# Patient Record
Sex: FEMALE | Race: WHITE | NOT HISPANIC OR LATINO | URBAN - METROPOLITAN AREA
[De-identification: names, ages, dates, MRNs, and addresses within clinical notes are randomized per-mention and may not be internally consistent; named-entity substitution may affect disease eponyms.]

---

## 2024-06-14 DIAGNOSIS — L40.50 PSORIATIC ARTHRITIS: Primary | ICD-10-CM

## 2024-06-14 DIAGNOSIS — M54.40 CHRONIC BILATERAL LOW BACK PAIN WITH SCIATICA, SCIATICA LATERALITY UNSPECIFIED: ICD-10-CM

## 2024-06-14 DIAGNOSIS — G89.29 CHRONIC BILATERAL LOW BACK PAIN WITH SCIATICA, SCIATICA LATERALITY UNSPECIFIED: ICD-10-CM

## 2024-06-14 RX ORDER — GABAPENTIN 100 MG/1
100 CAPSULE ORAL
COMMUNITY
Start: 2024-04-06 | End: 2024-06-14 | Stop reason: SDUPTHER

## 2024-06-14 RX ORDER — TRAMADOL HYDROCHLORIDE 50 MG/1
50 TABLET ORAL 3 TIMES DAILY PRN
COMMUNITY
End: 2024-06-14 | Stop reason: SDUPTHER

## 2024-06-15 RX ORDER — TRAMADOL HYDROCHLORIDE 50 MG/1
TABLET ORAL
Qty: 540 TABLET | Refills: 0 | Status: SHIPPED | OUTPATIENT
Start: 2024-06-15

## 2024-06-15 RX ORDER — GABAPENTIN 100 MG/1
CAPSULE ORAL
Qty: 450 CAPSULE | Refills: 0 | Status: SHIPPED | OUTPATIENT
Start: 2024-06-15

## 2024-08-15 PROBLEM — M25.50 JOINT PAIN: Status: ACTIVE | Noted: 2024-08-15

## 2024-08-19 ENCOUNTER — SPECIALTY PHARMACY (OUTPATIENT)
Age: 64
End: 2024-08-19
Payer: COMMERCIAL

## 2024-08-23 ENCOUNTER — LAB (OUTPATIENT)
Facility: HOSPITAL | Age: 64
End: 2024-08-23
Payer: COMMERCIAL

## 2024-08-23 ENCOUNTER — OFFICE VISIT (OUTPATIENT)
Age: 64
End: 2024-08-23
Payer: COMMERCIAL

## 2024-08-23 VITALS
HEART RATE: 70 BPM | BODY MASS INDEX: 24.98 KG/M2 | SYSTOLIC BLOOD PRESSURE: 108 MMHG | TEMPERATURE: 97.2 F | DIASTOLIC BLOOD PRESSURE: 70 MMHG | WEIGHT: 155.4 LBS | HEIGHT: 66 IN

## 2024-08-23 DIAGNOSIS — G89.29 CHRONIC LOW BACK PAIN WITHOUT SCIATICA, UNSPECIFIED BACK PAIN LATERALITY: ICD-10-CM

## 2024-08-23 DIAGNOSIS — Z15.89 HLA B27 (HLA B27 POSITIVE): ICD-10-CM

## 2024-08-23 DIAGNOSIS — M54.50 CHRONIC LOW BACK PAIN WITHOUT SCIATICA, UNSPECIFIED BACK PAIN LATERALITY: ICD-10-CM

## 2024-08-23 DIAGNOSIS — Z79.899 HIGH RISK MEDICATION USE: ICD-10-CM

## 2024-08-23 DIAGNOSIS — R53.83 OTHER FATIGUE: ICD-10-CM

## 2024-08-23 DIAGNOSIS — L40.50 PSORIATIC ARTHRITIS: ICD-10-CM

## 2024-08-23 DIAGNOSIS — M54.40 CHRONIC BILATERAL LOW BACK PAIN WITH SCIATICA, SCIATICA LATERALITY UNSPECIFIED: ICD-10-CM

## 2024-08-23 DIAGNOSIS — G89.29 CHRONIC BILATERAL LOW BACK PAIN WITH SCIATICA, SCIATICA LATERALITY UNSPECIFIED: ICD-10-CM

## 2024-08-23 DIAGNOSIS — L40.50 PSA (PSORIATIC ARTHRITIS): ICD-10-CM

## 2024-08-23 DIAGNOSIS — L40.50 PSA (PSORIATIC ARTHRITIS): Primary | ICD-10-CM

## 2024-08-23 DIAGNOSIS — L40.9 PSORIASIS: ICD-10-CM

## 2024-08-23 DIAGNOSIS — Z02.89 PAIN MEDICATION AGREEMENT: ICD-10-CM

## 2024-08-23 DIAGNOSIS — Z79.899 ENCOUNTER FOR MEDICATION MANAGEMENT: ICD-10-CM

## 2024-08-23 DIAGNOSIS — M77.8 ELBOW TENDINITIS: ICD-10-CM

## 2024-08-23 LAB
ALBUMIN SERPL-MCNC: 4.6 G/DL (ref 3.5–5.2)
ALBUMIN/GLOB SERPL: 1.7 G/DL
ALP SERPL-CCNC: 131 U/L (ref 39–117)
ALT SERPL W P-5'-P-CCNC: 12 U/L (ref 1–33)
AMPHET+METHAMPHET UR QL: NEGATIVE
AMPHETAMINES UR QL: NEGATIVE
ANION GAP SERPL CALCULATED.3IONS-SCNC: 9.4 MMOL/L (ref 5–15)
AST SERPL-CCNC: 20 U/L (ref 1–32)
BARBITURATES UR QL SCN: NEGATIVE
BASOPHILS # BLD AUTO: 0.06 10*3/MM3 (ref 0–0.2)
BASOPHILS NFR BLD AUTO: 0.7 % (ref 0–1.5)
BENZODIAZ UR QL SCN: NEGATIVE
BILIRUB SERPL-MCNC: 0.3 MG/DL (ref 0–1.2)
BUN SERPL-MCNC: 12 MG/DL (ref 8–23)
BUN/CREAT SERPL: 14 (ref 7–25)
BUPRENORPHINE SERPL-MCNC: NEGATIVE NG/ML
CALCIUM SPEC-SCNC: 10.1 MG/DL (ref 8.6–10.5)
CANNABINOIDS SERPL QL: NEGATIVE
CHLORIDE SERPL-SCNC: 104 MMOL/L (ref 98–107)
CO2 SERPL-SCNC: 27.6 MMOL/L (ref 22–29)
COCAINE UR QL: NEGATIVE
CREAT SERPL-MCNC: 0.86 MG/DL (ref 0.57–1)
DEPRECATED RDW RBC AUTO: 51.3 FL (ref 37–54)
EGFRCR SERPLBLD CKD-EPI 2021: 77 ML/MIN/1.73
EOSINOPHIL # BLD AUTO: 0.4 10*3/MM3 (ref 0–0.4)
EOSINOPHIL NFR BLD AUTO: 4.6 % (ref 0.3–6.2)
ERYTHROCYTE [DISTWIDTH] IN BLOOD BY AUTOMATED COUNT: 15.2 % (ref 12.3–15.4)
FENTANYL UR-MCNC: NEGATIVE NG/ML
GLOBULIN UR ELPH-MCNC: 2.7 GM/DL
GLUCOSE SERPL-MCNC: 97 MG/DL (ref 65–99)
HCT VFR BLD AUTO: 38.9 % (ref 34–46.6)
HGB BLD-MCNC: 13.2 G/DL (ref 12–15.9)
IMM GRANULOCYTES # BLD AUTO: 0.02 10*3/MM3 (ref 0–0.05)
IMM GRANULOCYTES NFR BLD AUTO: 0.2 % (ref 0–0.5)
LYMPHOCYTES # BLD AUTO: 2.67 10*3/MM3 (ref 0.7–3.1)
LYMPHOCYTES NFR BLD AUTO: 31 % (ref 19.6–45.3)
MCH RBC QN AUTO: 31.3 PG (ref 26.6–33)
MCHC RBC AUTO-ENTMCNC: 33.9 G/DL (ref 31.5–35.7)
MCV RBC AUTO: 92.2 FL (ref 79–97)
METHADONE UR QL SCN: NEGATIVE
MONOCYTES # BLD AUTO: 0.53 10*3/MM3 (ref 0.1–0.9)
MONOCYTES NFR BLD AUTO: 6.1 % (ref 5–12)
NEUTROPHILS NFR BLD AUTO: 4.94 10*3/MM3 (ref 1.7–7)
NEUTROPHILS NFR BLD AUTO: 57.4 % (ref 42.7–76)
NRBC BLD AUTO-RTO: 0 /100 WBC (ref 0–0.2)
OPIATES UR QL: NEGATIVE
OXYCODONE UR QL SCN: NEGATIVE
PCP UR QL SCN: NEGATIVE
PLATELET # BLD AUTO: 250 10*3/MM3 (ref 140–450)
PMV BLD AUTO: 11 FL (ref 6–12)
POTASSIUM SERPL-SCNC: 4 MMOL/L (ref 3.5–5.2)
PROT SERPL-MCNC: 7.3 G/DL (ref 6–8.5)
RBC # BLD AUTO: 4.22 10*6/MM3 (ref 3.77–5.28)
SODIUM SERPL-SCNC: 141 MMOL/L (ref 136–145)
TRICYCLICS UR QL SCN: NEGATIVE
WBC NRBC COR # BLD AUTO: 8.62 10*3/MM3 (ref 3.4–10.8)

## 2024-08-23 PROCEDURE — 86480 TB TEST CELL IMMUN MEASURE: CPT

## 2024-08-23 PROCEDURE — 85025 COMPLETE CBC W/AUTO DIFF WBC: CPT

## 2024-08-23 PROCEDURE — 80307 DRUG TEST PRSMV CHEM ANLYZR: CPT

## 2024-08-23 PROCEDURE — 80053 COMPREHEN METABOLIC PANEL: CPT

## 2024-08-23 PROCEDURE — 36415 COLL VENOUS BLD VENIPUNCTURE: CPT

## 2024-08-23 RX ORDER — TRAMADOL HYDROCHLORIDE 50 MG/1
TABLET ORAL
Qty: 540 TABLET | Refills: 0 | Status: SHIPPED | OUTPATIENT
Start: 2024-08-23 | End: 2025-02-14 | Stop reason: SDUPTHER

## 2024-08-23 RX ORDER — FLUTICASONE PROPIONATE 50 MCG
SPRAY, SUSPENSION (ML) NASAL
COMMUNITY

## 2024-08-23 RX ORDER — CLOBETASOL PROPIONATE 0.5 MG/G
CREAM TOPICAL
COMMUNITY

## 2024-08-23 RX ORDER — ZOLPIDEM TARTRATE 12.5 MG/1
1 TABLET, FILM COATED, EXTENDED RELEASE ORAL
COMMUNITY

## 2024-08-23 RX ORDER — MELOXICAM 15 MG
TABLET ORAL AS NEEDED
COMMUNITY

## 2024-08-23 RX ORDER — IBUPROFEN 800 MG/1
1 TABLET, FILM COATED ORAL EVERY 6 HOURS PRN
COMMUNITY

## 2024-08-23 RX ORDER — HYDROCODONE BITARTRATE AND ACETAMINOPHEN 5; 325 MG/1; MG/1
TABLET ORAL
COMMUNITY

## 2024-08-23 RX ORDER — DULOXETIN HYDROCHLORIDE 30 MG/1
CAPSULE, DELAYED RELEASE ORAL
COMMUNITY
Start: 2024-07-22

## 2024-08-23 RX ORDER — ACYCLOVIR 50 MG/G
OINTMENT TOPICAL
COMMUNITY

## 2024-08-23 RX ORDER — SECUKINUMAB 150 MG/ML
150 INJECTION SUBCUTANEOUS
Qty: 1.96 ML | Refills: 4 | Status: SHIPPED | OUTPATIENT
Start: 2024-08-23

## 2024-08-23 RX ORDER — GABAPENTIN 100 MG/1
CAPSULE ORAL
Qty: 450 CAPSULE | Refills: 0 | Status: SHIPPED | OUTPATIENT
Start: 2024-08-23 | End: 2025-02-14 | Stop reason: SDUPTHER

## 2024-08-23 NOTE — PROGRESS NOTES
[facility] Follow Up Visit     Office Follow Up      Date: 08/23/2024   Patient Name: Tiffany Lucio  MRN: [id number]  YOB: 1960    Referring Physician: Nya Sheppard*     Chief Complaint   Patient presents with    Joint Pain       History of Present Illness: Tiffany Lucio is a 63 y.o. female who is here today for follow up on   History of Present Illness  The patient presents for evaluation of multiple medical concerns.    She is experiencing severe back pain that extends to the top of her foot, limiting her mobility to short distances. She reports a limp [place]. Despite the pain, she does not fear falling. She also mentions leg weakness and an instance where her leg gave out. Her pain level this week has been around 7 out of 10. She believes her pain is exacerbated by stress. She has been under the care of a pain clinic since May 2024 and is awaiting approval for an injection. She has an appointment with Dr. Damaris Hsu in September 2024 for a minimally invasive procedure to shave off a portion of the vertebrae. She is currently on gabapentin 100 mg and tramadol, both of which need refilling. She used to use a TENS unit but no longer has it. She reports no loss of bowel or bladder control.    She reports numbness in her fingers and occasional locking up of her hands, but no severe pain or swelling in her extremity joints. She does not have psoriasis, attributing her clear skin to Cosentyx, which she believes also helps with her psoriatic arthritis. She has one dose of Cosentyx left.    She has a maculopapular skin lesion on the top of her right shoulder and in the axillary region of her left upper extremity. She was informed by her family doctor that it is not cancerous. She is awaiting an appointment with a dermatologist in [place]. She also plans to consult the dermatologist about her  worsening varicose veins.       Result Review :        Results            Subjective     Allergies   Allergen Reactions    Acetaminophen Provider Review Needed    Methotrexate Derivatives Provider Review Needed    Sulfa Antibiotics Nausea And Vomiting    Sulfamethoxazole Nausea And Vomiting    Trimethoprim Nausea And Vomiting         Current Outpatient Medications:     acyclovir (ZOVIRAX) 5 % ointment, APPLY TO AFFECTED AREA SIX TIMES A DAY FOR 7 DAYS, Disp: , Rfl:     clobetasol propionate (TEMOVATE) 0.05 % cream, APPLY TOPICALLY TO THE AFFECTED AREA TWICE DAILY FOR UP TO 2 WEEKS. STOP FOR 1 WEEK. REPEAT AS NEEDED, Disp: , Rfl:     DULoxetine (CYMBALTA) 30 MG capsule, TAKE 1 CAPSULE BY MOUTH EVERY NIGHT AT BEDTIME FOR PAIN, Disp: , Rfl:     fluticasone (FLONASE) 50 MCG/ACT nasal spray, INSTILL 1 SPRAY INTO EACH NOSTRIL DAILY, Disp: , Rfl:     gabapentin (NEURONTIN) 100 MG capsule, One capsule in the morning and afternoon. Three capsules at bedtime, Disp: 450 capsule, Rfl: 0    HYDROcodone-acetaminophen (NORCO) 5-325 MG per tablet, TAKE 1 TABLET BY MOUTH EVERY 12 HOURS AS NEEDED. DO NOT USE IN CONJUNCTION WITH TRAMADOL, Disp: , Rfl:     ibuprofen (ADVIL,MOTRIN) 800 MG tablet, Take 1 tablet by mouth Every 6 (Six) Hours As Needed., Disp: , Rfl:     levothyroxine (SYNTHROID, LEVOTHROID) 50 MCG tablet, Take 1 tablet by mouth Daily., Disp: , Rfl:     lisinopril (PRINIVIL,ZESTRIL) 10 MG tablet, Take 1 tablet by mouth Daily., Disp: , Rfl:     Mobic 15 MG tablet, As Needed., Disp: , Rfl:     Secukinumab (Cosentyx) 150 MG/ML solution prefilled syringe, Inject  under the skin into the appropriate area as directed., Disp: , Rfl:     sertraline (ZOLOFT) 100 MG tablet, Take 1 tablet by mouth Daily., Disp: , Rfl:     traMADol (ULTRAM) 50 MG tablet, Take 1-2 tablets TID prn pain, Disp: 540 tablet, Rfl: 0    zolpidem CR (AMBIEN CR) 12.5 MG CR tablet, Take 1 tablet by mouth every night at bedtime., Disp: , Rfl:     Past Medical  "History:   Diagnosis Date    Back pain     H/O umbilical hernia repair     Hyperlipidemia     Hypermobility of joint     Hypertension     Hypothyroidism     Joint pain     Rash of back         Past Surgical History:   Procedure Laterality Date    APPENDECTOMY      HIP BIPOLAR REPLACEMENT      HIP BIPOLAR REPLACEMENT      TOOTH EXTRACTION         Family History   Problem Relation Age of Onset    Heart failure Mother     Other (Bladder cancer) Father     Heart disease Father     Arthritis Sister     Breast cancer Sister     Brain cancer Brother         Social History     Socioeconomic History    Marital status:    Tobacco Use    Smoking status: Every Day     Types: Cigarettes    Smokeless tobacco: Never   Vaping Use    Vaping status: Never Used   Substance and Sexual Activity    Alcohol use: Defer    Drug use: Defer    Sexual activity: Defer       Review of Systems   Musculoskeletal:  Positive for arthralgias, back pain and neck stiffness.   Neurological:  Positive for weakness and numbness.      I have reviewed and updated the patient's chief complaint, history of present illness, review of systems, past medical history, surgical history, family history, social history, medications and allergy list as appropriate.     Objective    Vital Signs:   Vitals:    08/23/24 1237   BP: 108/70   BP Location: Left arm   Patient Position: Sitting   Cuff Size: Adult   Pulse: 70   Temp: 97.2 °F (36.2 °C)   Weight: 70.5 kg (155 lb 6.4 oz)   Height: 167.6 cm (66\")   PainSc:   7     Tiffany Lucio reports a pain score of 7.  Given her pain assessment as noted, treatment options were discussed and the following options were decided upon as a follow-up plan to address the patient's pain: .      Body mass index is 25.08 kg/m².        Physical Exam   Physical Exam  Patient is alert, female, and in no acute distress.  Head is atraumatic and normocephalic with pupils equal and round. Extraocular muscles are intact. Oropharynx is " negative, but the tongue is slightly dry in the middle.  Lungs are clear.  Heart is regular without rubs, gallops, or murmurs.  Lower lumbar spine is very tender to touch. Right shoulder is tender. Patient has relatively good range of motion. Heberden's nodes, first CMC squaring, and a prominent ulnar styloid on the right hand without synovitis are present. Similar findings are observed on the left hand. Crepitus of the left knee and crepitus of the right knee are noted. Knees, ankles, and MTPs are nontender and nonswollen. Varicose veins are present in the lower extremities.  Skin is tan and negative for psoriasis. A maculopapular skin lesion is present on the top of the right shoulder and the axillary region of the left upper extremity.    Physical Exam  There is currently no information documented on the homunculus. Go to the Rheumatology activity and [facility].     Results Review:   Imaging Results (Last 24 Hours)       ** No results found for the last 24 hours. **            Procedures    Assessment / Plan    Assessment/Plan:   There are no diagnoses linked to this encounter.      Assessment & Plan  1. Psoriatic Arthritis.  Continuation of Cosentyx is advised. She reports that Cosentyx may help with her hand symptoms but not her back pain. She is advised to continue monitoring her symptoms and report any changes.    2. Back Pain.  Gabapentin 100 mg will be continued for pain management. She reports severe pain radiating to the top of her foot and some leg weakness. She is scheduled to follow up with Dr. Duff regarding her progressing symptoms and potential insurance coverage for surgery. She is advised to consider using a TENS unit for additional pain relief.    3. Psoriasis.  Her condition is currently well-managed with Cosentyx. She reports her skin has been clear.    4. Granuloma Annulare.  She has a maculopapular skin lesion on the top of the right shoulder and the axillary region  of the left upper extremity. She is HLA-B27 positive.    5. Pain Medication Management.  A [facility] pain agreement will be signed today due to her prescription of tramadol and gabapentin. She is due for a urine drug screen today.    6. High-Risk Medication.  Cosentyx has been well-tolerated and effective. She is due for her QTB and labs today.          Follow Up:   No follow-ups on file.       Nola Ellis MD  [facility]     Encounter Administratively Closed by Health Information Management

## 2024-08-26 ENCOUNTER — TELEPHONE (OUTPATIENT)
Age: 64
End: 2024-08-26
Payer: COMMERCIAL

## 2024-08-26 NOTE — TELEPHONE ENCOUNTER
File Link    Scan on 8/26/2024 1139 by [employer], [place]: cosentyx 150mg/ml 8/26/24        Key Information    Document ID File Type Document Type Description   [id number] Image MEDICATIONS - SCAN cosentyx 150mg/ml 8/26/24     Import Information    Attached At Date Time User Dept   Encounter Level 8/26/2024 11:39 AM [employer], [place]      Encounter    Scanned Document on 8/26/24 with [employer], [place]

## 2024-08-27 LAB
GAMMA INTERFERON BACKGROUND BLD IA-ACNC: 0.02 IU/ML
M TB IFN-G BLD-IMP: NEGATIVE
M TB IFN-G CD4+ BCKGRND COR BLD-ACNC: 0.02 IU/ML
M TB IFN-G CD4+CD8+ BCKGRND COR BLD-ACNC: 0.02 IU/ML
MITOGEN IGNF BCKGRD COR BLD-ACNC: >10 IU/ML
QUANTIFERON INCUBATION: NORMAL
SERVICE CMNT-IMP: NORMAL

## 2024-09-17 PROBLEM — Z15.89 HLA B27 (HLA B27 POSITIVE): Status: ACTIVE | Noted: 2024-09-17

## 2024-09-17 PROBLEM — L40.9 PSORIASIS: Status: ACTIVE | Noted: 2024-09-17

## 2024-09-17 PROBLEM — M77.8 ELBOW TENDINITIS: Status: ACTIVE | Noted: 2024-09-17

## 2024-09-17 PROBLEM — M54.42 CHRONIC BILATERAL LOW BACK PAIN WITH SCIATICA: Status: ACTIVE | Noted: 2024-09-17

## 2024-09-17 PROBLEM — Z79.899 HIGH RISK MEDICATION USE: Status: ACTIVE | Noted: 2024-09-17

## 2024-09-17 PROBLEM — L40.50 PSORIATIC ARTHRITIS: Status: ACTIVE | Noted: 2024-09-17

## 2024-09-17 PROBLEM — M54.40 CHRONIC BILATERAL LOW BACK PAIN WITH SCIATICA: Status: ACTIVE | Noted: 2024-09-17

## 2024-09-17 PROBLEM — M54.41 CHRONIC BILATERAL LOW BACK PAIN WITH SCIATICA: Status: ACTIVE | Noted: 2024-09-17

## 2024-09-17 PROBLEM — G89.29 CHRONIC BILATERAL LOW BACK PAIN WITH SCIATICA: Status: ACTIVE | Noted: 2024-09-17

## 2024-09-17 PROBLEM — Z02.89 PAIN MEDICATION AGREEMENT: Status: ACTIVE | Noted: 2024-09-17

## 2024-09-17 NOTE — ASSESSMENT & PLAN NOTE
Sacroiliac ,Lower back ,First Cmc joint's,Right ankle  History of Mcp/Pip stiffness with Pip swelling.  Started a trial of Ssz for inflammatory arthritis/Spondyloarthropathy but patient was unable to take this.   Developed psoriasis.  Dramatic response to Steroids.  No h/o liver disease- Rarely drinks.  Methotrexate stopped 2/19 secondary to burning mouth/ oral sores.  Humira started March 2018- Discontinued 3/20 due to loss of effect.   Started Enbrel March 2020- Does not work for patient on her joints.   Started Cosentyx March 2021- very helpful, restarted 8/2022  Psoriatic joints are doing well.    Plan:    Continue Cosentyx.   For OA of the thumbs:  Comfort cool splints  Compression gloves  Biofreeze  Can schedule injection if needed.

## 2024-09-17 NOTE — ASSESSMENT & PLAN NOTE
Chronic; s/p 2 surgeries- Dr. Duff.  Patient has a +HLA B27 with significant tenderness over her SI joints and Peripheral Arthritis.  DDD +--   Over the past several months she C/o pain that radiates from R buttock to foot - like toothache that is severe at HS. Denies numbness.  MRI has been ordered multiple times in order for her f/u with Dr. Duff; However she has had many issues with hospital scheduling.  MRI of the lumbar spine- Progression of severe spinal stenosis at L3-4. Stable L4-5 fixation. Mild DDD L1-L3.  Patient was supposed to f/u with Dr. Duff after MRI was obtained.  S/p Lumbar fusion L3-4 ext 9/2020 with Dr. Duff  She has pain that radiates from her buttock down to the knee.    Plan:    Continue gabapentin.  Dr. Duff  - They wanted to do L2 to iliac fusion. Her insurance denied this until it became an emergency.  THIS IS HER WORST ISSUE TODAY.

## 2024-09-17 NOTE — ASSESSMENT & PLAN NOTE
Tramadol. Gabapentin   Agreement signed 11/18/21    Plan:    On Tramadol and gabapentin    UDS 8/23    Gabapentin to 100 in am , 100 in afternoon and 300mg at bedtime.  Lyrica is another option.

## 2024-09-17 NOTE — ASSESSMENT & PLAN NOTE
Lumbar over her surgery scar.  Present on LE's, Back.  Saw Derm who prescribed topicals.  S/p Humira and Enbrel, MTX, SSZ, Cosentyx.    Plan:  Clear on Cosentyx.

## 2024-09-17 NOTE — ASSESSMENT & PLAN NOTE
Cosentyx - well tolerated and very effective.   QTB negative 8/23  S/p Covid vaccine.     Plan:    Cbc,Cmp Q 4 months - 8/23 She uses ibuprofen every 2 days.       Would hold Cosentyx for any infection or illness, Seek treatment and when well and illness is resolved you may restart medication.

## 2024-12-27 ENCOUNTER — TELEPHONE (OUTPATIENT)
Age: 64
End: 2024-12-27
Payer: COMMERCIAL

## 2024-12-27 NOTE — TELEPHONE ENCOUNTER
PT HAD TO BE CANCELLED DUE TO A PROVIDER EMERGENCY. I HAVE REACHED OUT VIA [employer], [employer] AND PHONE (IF ABLE). IF PT CALLS BACK TO RESCHEDULE, PLEASE SCHEDULE WITH [facility] OR [facility]. IF THE PT WANTS TO SEE DR. NOBLE, SHE IS BOOKED UNTIL APRIL. IF IT IS A NEW PT, CONFRIM IF THEY ARE INTRESTED IN SEEING ANOTHER PROVIDER OR IF THEY WANT TO WAIT FOR DR. NOBLE. PT HAS BEEN ADDED TO THE CANCELLATION LIST AS HIGH PRIORITY.         -HUB READY TO SCHEDULE.

## 2025-02-14 DIAGNOSIS — L40.50 PSORIATIC ARTHRITIS: ICD-10-CM

## 2025-02-14 DIAGNOSIS — G89.29 CHRONIC BILATERAL LOW BACK PAIN WITH SCIATICA, SCIATICA LATERALITY UNSPECIFIED: ICD-10-CM

## 2025-02-14 DIAGNOSIS — M54.40 CHRONIC BILATERAL LOW BACK PAIN WITH SCIATICA, SCIATICA LATERALITY UNSPECIFIED: ICD-10-CM

## 2025-02-14 NOTE — TELEPHONE ENCOUNTER
Caller: Tiffany Lucio    Relationship: Self    Best call back number: [phone]     Requested Prescriptions:   gabapentin (NEURONTIN) 100 MG capsule        traMADol (ULTRAM) 50 MG tablet     Pharmacy where request should be sent:     [facility] at [address] [place] [address] [address] [place], [place]        Additional details provided by patient: PATIENT SAID THEY HAVE BETWEEN A WEEK AND 2 WEEKS AND NEEDS DAYAN    Does the patient have less than a 3 day supply:  [] Yes  [x] No    Would you like a call back once the refill request has been completed: [x] Yes [] No    If the office needs to give you a call back, can they leave a voicemail: [x] Yes [] No    Zacarias Garcia Rep   02/14/25 12:54 EST

## 2025-02-18 RX ORDER — GABAPENTIN 100 MG/1
CAPSULE ORAL
Qty: 450 CAPSULE | Refills: 0 | Status: SHIPPED | OUTPATIENT
Start: 2025-02-18

## 2025-02-18 RX ORDER — TRAMADOL HYDROCHLORIDE 50 MG/1
TABLET ORAL
Qty: 540 TABLET | Refills: 0 | Status: SHIPPED | OUTPATIENT
Start: 2025-02-18

## 2025-03-06 DIAGNOSIS — L40.50 PSORIATIC ARTHRITIS: ICD-10-CM

## 2025-03-06 DIAGNOSIS — G89.29 CHRONIC BILATERAL LOW BACK PAIN WITH SCIATICA, SCIATICA LATERALITY UNSPECIFIED: ICD-10-CM

## 2025-03-06 DIAGNOSIS — M54.40 CHRONIC BILATERAL LOW BACK PAIN WITH SCIATICA, SCIATICA LATERALITY UNSPECIFIED: ICD-10-CM

## 2025-03-06 NOTE — TELEPHONE ENCOUNTER
Caller: Tiffany Lucio     Relationship: Self     Best call back number: [phone]      Requested Prescriptions:   gabapentin (NEURONTIN) 100 MG capsule          traMADol (ULTRAM) 50 MG tablet      Pharmacy where request should be sent:      [facility] at [address] [place] [address]           Additional details provided by patient: PT IS COMPLETELY OUT.      Does the patient have less than a 3 day supply:  [x] Yes  [] No     Would you like a call back once the refill request has been completed: [x] Yes [] No     If the office needs to give you a call back, can they leave a voicemail: [x] Yes [] No

## 2025-03-07 RX ORDER — GABAPENTIN 100 MG/1
CAPSULE ORAL
Qty: 450 CAPSULE | Refills: 0 | Status: SHIPPED | OUTPATIENT
Start: 2025-03-07

## 2025-03-07 RX ORDER — TRAMADOL HYDROCHLORIDE 50 MG/1
TABLET ORAL
Qty: 540 TABLET | Refills: 0 | Status: SHIPPED | OUTPATIENT
Start: 2025-03-07

## 2025-04-23 PROBLEM — M18.0 PRIMARY OSTEOARTHRITIS OF BOTH FIRST CARPOMETACARPAL JOINTS: Status: ACTIVE | Noted: 2025-04-23

## 2025-05-27 NOTE — ASSESSMENT & PLAN NOTE
Sacroiliac ,Lower back ,First Cmc joint's,Right ankle  History of Mcp/Pip stiffness with Pip swelling.  Started a trial of Ssz for inflammatory arthritis/Spondyloarthropathy but patient was unable to take this.   Developed psoriasis.  Dramatic response to Steroids.  No h/o liver disease- Rarely drinks.  Methotrexate stopped 2/19 secondary to burning mouth/ oral sores.  Humira started March 2018- Discontinued 3/20 due to loss of effect.   Started Enbrel March 2020- Does not work for patient on her joints.   Started Cosentyx March 2021- very helpful, restarted 8/2022  Psoriatic joints are doing well.    Plan:  PA Cosentyx.   For OA of the thumbs:  Comfort cool splints  Compression gloves  Biofreeze  Can schedule injection if needed.     Orders:    QuantiFERON-TB Gold Plus (Li-Hep)    Comprehensive Metabolic Panel    C-reactive Protein    Sedimentation Rate    CBC (No Diff)

## 2025-05-27 NOTE — ASSESSMENT & PLAN NOTE
Cosentyx - has been off medication.   PA today as she states this was the most effective for pain and psoriasis.   QTB negative 8/23  S/p Covid vaccine.     Plan:    Labs today, update QTB and hep panel.     Would hold Cosentyx for any infection or illness, Seek treatment and when well and illness is resolved you may restart medication.  Orders:    Urine Drug Screen - Urine, Clean Catch

## 2025-05-27 NOTE — ASSESSMENT & PLAN NOTE
Chronic; s/p 3 surgeries- Dr. Duff.  Patient has a +HLA B27 with significant tenderness over her SI joints and Peripheral Arthritis.  DDD +--   Continues to reports pain that radiates from R buttock to foot.   MRI of the lumbar spine- Progression of severe spinal stenosis at L3-4. Stable L4-5 fixation. Mild DDD L1-L3.  Patient was supposed to have an additional sx with Dr. Duff however insurance will not cover the sx.   S/p Lumbar fusion L3-4 ext 9/2020 with Dr. Duff  She had injections of the SI joint with pain management, this was only effective for a few days.     Plan:    Continue gabapentin  Continue with pain management clinic.   Dr. Duff  - Appointment this month, continue to follow up.    Orders:    gabapentin (NEURONTIN) 400 MG capsule; Take 1 capsule by mouth Every Night.

## 2025-05-27 NOTE — ASSESSMENT & PLAN NOTE
Lumbar over her surgery scar is reported as most common location for breakouts.   Present on LE's, abdomen, groin and buttocks.   Saw Derm who prescribed topicals.  S/p Humira and Enbrel, MTX, SSZ, Cosentyx.    Plan:  PA Cosentyx- was clear on medication previously.          unchanged

## 2025-05-27 NOTE — ASSESSMENT & PLAN NOTE
Gabapentin   Agreement - update at next visit   UDS ordered 6/2/25    Plan:  gabapentin - change dosage to 400mg nightly as this is how patient has been taking per suggestion of pain management, if she would like pain management to take over medications she is to let us know.   She takes Hydrocodone per pain management at this time.

## 2025-05-27 NOTE — PROGRESS NOTES
[facility] Follow Up Visit     Office Follow Up      Date: 06/02/2025   Patient Name: Tiffany Lucio  MRN: [id number]  YOB: 1960    Referring Physician: No ref. provider found     Chief Complaint   Patient presents with    Follow-up    Psoriatic arthritis       History of Present Illness: Tiffany Lucio is a 64 y.o. female who is here today for follow up of PSA and positive HLAB27 with chronic back pain    Patient was last seen by Dr. Nola Ellis 8/23/2024.   Today patient reports feeling worse.  She rates her pain severity 7/10 with 30 minutes of early morning stiffness.  She has been prescribed hydrocodone 10/325 every 6 hours by another provider.  She is no longer taking Cymbalta, Ultram, Mobic, or Cosentyx. Her insurance would not cover the Cosentyx, she reports she has been off this medication for about a year and noted her symptoms were much improved when she was taking it.     She sees Dr. Duff June 26th, this month. She has had 3 back surgeries with him and planned for a 4th, however her insurance would not approve this planned surgery. Back pain is driving pain score. She did get in with pain management and she had injections in her SI joint which only helped for a few days. She is now having pain radiating into her legs, right is worse than the left. Pain management suggested she take 400mg Gabapentin at night time, she has not been taking daytime dose as they make her drowsy. She has been taking the 400mg nightly as suggested, she is asking today about this dosage.     Psoriasis- she has had some breakouts to her foot, groin, buttocks and abdomen.       Subjective     Allergies   Allergen Reactions    Acetaminophen Provider Review Needed    Methotrexate Derivatives Provider Review Needed    Sulfa Antibiotics Nausea And Vomiting    Sulfamethoxazole Nausea And Vomiting    Trimethoprim Nausea And Vomiting       Current Outpatient Medications:      acyclovir (ZOVIRAX) 5 % ointment, APPLY TO AFFECTED AREA SIX TIMES A DAY FOR 7 DAYS, Disp: , Rfl:     clobetasol propionate (TEMOVATE) 0.05 % cream, APPLY TOPICALLY TO THE AFFECTED AREA TWICE DAILY FOR UP TO 2 WEEKS. STOP FOR 1 WEEK. REPEAT AS NEEDED, Disp: , Rfl:     fluticasone (FLONASE) 50 MCG/ACT nasal spray, INSTILL 1 SPRAY INTO EACH NOSTRIL DAILY, Disp: , Rfl:     HYDROcodone-acetaminophen (NORCO)  MG per tablet, Take 1 tablet by mouth Every 6 (Six) Hours As Needed for Severe Pain., Disp: , Rfl:     hydrocortisone 2.5 % cream, Apply 1 Application topically to the appropriate area as directed 2 (Two) Times a Day., Disp: , Rfl:     ibuprofen (ADVIL,MOTRIN) 200 MG tablet, Take 1 tablet by mouth Every 6 (Six) Hours As Needed for Mild Pain., Disp: , Rfl:     levothyroxine (SYNTHROID, LEVOTHROID) 50 MCG tablet, Take 1 tablet by mouth Daily., Disp: , Rfl:     lisinopril (PRINIVIL,ZESTRIL) 10 MG tablet, Take 1 tablet by mouth Daily., Disp: , Rfl:     sertraline (ZOLOFT) 100 MG tablet, Take 1 tablet by mouth Daily., Disp: , Rfl:     zolpidem CR (AMBIEN CR) 12.5 MG CR tablet, Take 1 tablet by mouth every night at bedtime., Disp: , Rfl:     gabapentin (NEURONTIN) 400 MG capsule, Take 1 capsule by mouth Every Night., Disp: 30 capsule, Rfl: 3    Secukinumab (Cosentyx) 150 MG/ML solution prefilled syringe, Inject 1 mL under the skin into the appropriate area as directed Every 30 (Thirty) Days. (Patient not taking: Reported on 6/2/2025), Disp: 1.96 mL, Rfl: 4    Past Medical History:   Diagnosis Date    Back pain     H/O umbilical hernia repair     Hyperlipidemia     Hypermobility of joint     Hypertension     Hypothyroidism     Joint pain     Rash of back         Past Surgical History:   Procedure Laterality Date    APPENDECTOMY      HIP BIPOLAR REPLACEMENT      HIP BIPOLAR REPLACEMENT      TOOTH EXTRACTION         Family History   Problem Relation Age of Onset    Heart failure Mother     Other (Bladder cancer)  "Father     Heart disease Father     Arthritis Sister     Breast cancer Sister     Brain cancer Brother         Social History     Socioeconomic History    Marital status:    Tobacco Use    Smoking status: Every Day     Types: Cigarettes    Smokeless tobacco: Never   Vaping Use    Vaping status: Never Used   Substance and Sexual Activity    Alcohol use: Defer    Drug use: Defer    Sexual activity: Defer       Review of Systems   Positive for activity change, fatigue, joint pain, back pain, muscle pain, neck pain, neck stiffness, lesion    I have reviewed and updated the patient's chief complaint, history of present illness, review of systems, past medical history, surgical history, family history, social history, medications and allergy list as appropriate.     Objective    Vital Signs:   Vitals:    06/02/25 1133   BP: 112/62   Pulse: 58   Temp: 97.2 °F (36.2 °C)   Weight: 65.7 kg (144 lb 12.8 oz)   Height: 167.6 cm (66\")   PainSc: 7    PainLoc: Back  Comment: legs         Body mass index is 23.37 kg/m².      Physical Exam  Constitutional:       Appearance: Normal appearance.   HENT:      Head: Normocephalic.   Eyes:      Pupils: Pupils are equal, round, and reactive to light.   Cardiovascular:      Rate and Rhythm: Regular rhythm.      Pulses: Normal pulses.      Heart sounds: Normal heart sounds.   Pulmonary:      Effort: Pulmonary effort is normal.      Breath sounds: Normal breath sounds.   Musculoskeletal:      Cervical back: Normal range of motion and neck supple.      Comments: No synovitis   No dactylitis   No pitting nails      Skin:     General: Skin is warm.      Comments: Small erythematous plaques and papules to foot, abdomen and groin    Neurological:      General: No focal deficit present.      Mental Status: She is alert and oriented to person, place, and time.   Psychiatric:         Behavior: Behavior normal.         Thought Content: Thought content normal.        Assessment / Plan  "     Assessment & Plan  Psoriatic arthritis  Sacroiliac ,Lower back ,First Cmc joint's,Right ankle  History of Mcp/Pip stiffness with Pip swelling.  Started a trial of Ssz for inflammatory arthritis/Spondyloarthropathy but patient was unable to take this.   Developed psoriasis.  Dramatic response to Steroids.  No h/o liver disease- Rarely drinks.  Methotrexate stopped 2/19 secondary to burning mouth/ oral sores.  Humira started March 2018- Discontinued 3/20 due to loss of effect.   Started Enbrel March 2020- Does not work for patient on her joints.   Started Cosentyx March 2021- very helpful, restarted 8/2022  Psoriatic joints are doing well.    Plan:  PA Cosentyx.   For OA of the thumbs:  Comfort cool splints  Compression gloves  Biofreeze  Can schedule injection if needed.     Orders:    QuantiFERON-TB Gold Plus (Li-Hep)    Comprehensive Metabolic Panel    C-reactive Protein    Sedimentation Rate    CBC (No Diff)     Chronic bilateral low back pain with sciatica, sciatica laterality unspecified  Chronic; s/p 3 surgeries- Dr. Duff.  Patient has a +HLA B27 with significant tenderness over her SI joints and Peripheral Arthritis.  DDD +--   Continues to reports pain that radiates from R buttock to foot.   MRI of the lumbar spine- Progression of severe spinal stenosis at L3-4. Stable L4-5 fixation. Mild DDD L1-L3.  Patient was supposed to have an additional sx with Dr. Duff however insurance will not cover the sx.   S/p Lumbar fusion L3-4 ext 9/2020 with Dr. Duff  She had injections of the SI joint with pain management, this was only effective for a few days.     Plan:    Continue gabapentin  Continue with pain management clinic.   Dr. Duff  - Appointment this month, continue to follow up.    Orders:    gabapentin (NEURONTIN) 400 MG capsule; Take 1 capsule by mouth Every Night.     Psoriasis  Lumbar over her surgery scar is reported as most common location for breakouts.   Present on LE's, abdomen, groin and buttocks.    Saw Derm who prescribed topicals.  S/p Humira and Enbrel, MTX, SSZ, Cosentyx.    Plan:  PA Cosentyx- was clear on medication previously.         HLA B27 (HLA B27 positive)  See above.        Pain medication agreement  Gabapentin   Agreement - update at next visit   UDS ordered 6/2/25    Plan:  gabapentin - change dosage to 400mg nightly as this is how patient has been taking per suggestion of pain management, if she would like pain management to take over medications she is to let us know.   She takes Hydrocodone per pain management at this time.           High risk medication use  Cosentyx - has been off medication.   PA today as she states this was the most effective for pain and psoriasis.   QTB negative 8/23  S/p Covid vaccine.     Plan:    Labs today, update QTB and hep panel.     Would hold Cosentyx for any infection or illness, Seek treatment and when well and illness is resolved you may restart medication.  Orders:    Urine Drug Screen - Urine, Clean Catch     Other fatigue  Labs today   Orders:    Hepatitis Panel, Acute    QuantiFERON-TB Gold Plus (Li-Hep)      Follow Up:   Return in about 4 months (around 10/2/2025) for NP's.     LUISA Gallegos    [facility]

## 2025-06-02 ENCOUNTER — OFFICE VISIT (OUTPATIENT)
Age: 65
End: 2025-06-02
Payer: COMMERCIAL

## 2025-06-02 ENCOUNTER — TELEPHONE (OUTPATIENT)
Age: 65
End: 2025-06-02

## 2025-06-02 VITALS
TEMPERATURE: 97.2 F | HEART RATE: 58 BPM | WEIGHT: 144.8 LBS | SYSTOLIC BLOOD PRESSURE: 112 MMHG | DIASTOLIC BLOOD PRESSURE: 62 MMHG | BODY MASS INDEX: 23.27 KG/M2 | HEIGHT: 66 IN

## 2025-06-02 DIAGNOSIS — Z02.89 PAIN MEDICATION AGREEMENT: ICD-10-CM

## 2025-06-02 DIAGNOSIS — G89.29 CHRONIC BILATERAL LOW BACK PAIN WITH SCIATICA, SCIATICA LATERALITY UNSPECIFIED: ICD-10-CM

## 2025-06-02 DIAGNOSIS — M54.42 CHRONIC BILATERAL LOW BACK PAIN WITH SCIATICA, SCIATICA LATERALITY UNSPECIFIED: ICD-10-CM

## 2025-06-02 DIAGNOSIS — M54.41 CHRONIC BILATERAL LOW BACK PAIN WITH SCIATICA, SCIATICA LATERALITY UNSPECIFIED: ICD-10-CM

## 2025-06-02 DIAGNOSIS — L40.9 PSORIASIS: ICD-10-CM

## 2025-06-02 DIAGNOSIS — Z79.899 HIGH RISK MEDICATION USE: Chronic | ICD-10-CM

## 2025-06-02 DIAGNOSIS — Z79.899 ENCOUNTER FOR MEDICATION MANAGEMENT: Chronic | ICD-10-CM

## 2025-06-02 DIAGNOSIS — L40.50 PSORIATIC ARTHRITIS: Primary | Chronic | ICD-10-CM

## 2025-06-02 DIAGNOSIS — R53.83 OTHER FATIGUE: ICD-10-CM

## 2025-06-02 DIAGNOSIS — Z15.89 HLA B27 (HLA B27 POSITIVE): ICD-10-CM

## 2025-06-02 RX ORDER — HYDROCORTISONE 25 MG/G
1 CREAM TOPICAL 2 TIMES DAILY
COMMUNITY

## 2025-06-02 RX ORDER — SECUKINUMAB 150 MG/ML
1 INJECTION SUBCUTANEOUS
Qty: 1 ML | Refills: 3 | Status: SHIPPED | OUTPATIENT
Start: 2025-06-02

## 2025-06-02 RX ORDER — IBUPROFEN 200 MG
200 TABLET ORAL EVERY 6 HOURS PRN
COMMUNITY

## 2025-06-02 RX ORDER — GABAPENTIN 400 MG/1
400 CAPSULE ORAL NIGHTLY
Qty: 30 CAPSULE | Refills: 3 | Status: SHIPPED | OUTPATIENT
Start: 2025-06-02 | End: 2025-06-06 | Stop reason: SINTOL

## 2025-06-02 RX ORDER — GABAPENTIN 400 MG/1
400 CAPSULE ORAL NIGHTLY
Qty: 30 CAPSULE | Refills: 3 | Status: CANCELLED | OUTPATIENT
Start: 2025-06-02

## 2025-06-02 NOTE — TELEPHONE ENCOUNTER
Patient has not been on her Cosentyx for about a year at this time because her insurance is not covering it from what she told me. Can we look into this for her? This was working well for her.

## 2025-06-02 NOTE — TELEPHONE ENCOUNTER
Specialty Pharmacy Patient Management Program  Per Protocol Prescription Order/Refill     Patient currently fills medications at [facility]  and is enrolled in an Rheumatology Patient Management Program.     Requested Prescriptions     Signed Prescriptions Disp Refills    Secukinumab (Cosentyx Sensoready Pen) 150 MG/ML solution auto-injector 1 mL 3     Sig: Inject 1 Pen under the skin into the appropriate area as directed Every 28 (Twenty-Eight) Days.     Authorizing Provider: DALY SELF     Ordering User: MELONIE HINES     Prescription orders above were sent to the pharmacy per Collaborative Care Agreement Protocol.

## 2025-06-03 ENCOUNTER — RESULTS FOLLOW-UP (OUTPATIENT)
Age: 65
End: 2025-06-03
Payer: COMMERCIAL

## 2025-06-03 LAB
ALBUMIN SERPL-MCNC: 4.5 G/DL (ref 3.5–5.2)
ALBUMIN/GLOB SERPL: 2.1 G/DL
ALP SERPL-CCNC: 105 U/L (ref 39–117)
ALT SERPL-CCNC: 9 U/L (ref 1–33)
AMPHETAMINES UR QL SCN: NEGATIVE NG/ML
AST SERPL-CCNC: 20 U/L (ref 1–32)
BARBITURATES UR QL SCN: NEGATIVE NG/ML
BENZODIAZ UR QL SCN: NEGATIVE NG/ML
BILIRUB SERPL-MCNC: 0.4 MG/DL (ref 0–1.2)
BUN SERPL-MCNC: 14 MG/DL (ref 8–23)
BUN/CREAT SERPL: 17.9 (ref 7–25)
BZE UR QL SCN: NEGATIVE NG/ML
CALCIUM SERPL-MCNC: 9.5 MG/DL (ref 8.6–10.5)
CANNABINOIDS UR QL SCN: NEGATIVE NG/ML
CHLORIDE SERPL-SCNC: 103 MMOL/L (ref 98–107)
CO2 SERPL-SCNC: 27.3 MMOL/L (ref 22–29)
CREAT SERPL-MCNC: 0.78 MG/DL (ref 0.57–1)
CREAT UR-MCNC: 120.6 MG/DL (ref 20–300)
CRP SERPL-MCNC: <0.3 MG/DL (ref 0–0.5)
EGFRCR SERPLBLD CKD-EPI 2021: 86 ML/MIN/1.73
ERYTHROCYTE [DISTWIDTH] IN BLOOD BY AUTOMATED COUNT: 15.7 % (ref 12.3–15.4)
ERYTHROCYTE [SEDIMENTATION RATE] IN BLOOD BY WESTERGREN METHOD: 3 MM/HR (ref 0–30)
GLOBULIN SER CALC-MCNC: 2.1 GM/DL
GLUCOSE SERPL-MCNC: 86 MG/DL (ref 65–99)
HAV IGM SERPL QL IA: NEGATIVE
HBV CORE IGM SERPL QL IA: NEGATIVE
HBV SURFACE AG SERPL QL IA: NEGATIVE
HCT VFR BLD AUTO: 38.1 % (ref 34–46.6)
HCV AB SERPL QL IA: NON REACTIVE
HCV AB SERPL QL IA: NORMAL
HGB BLD-MCNC: 12.2 G/DL (ref 12–15.9)
LABORATORY COMMENT REPORT: ABNORMAL
MCH RBC QN AUTO: 30.8 PG (ref 26.6–33)
MCHC RBC AUTO-ENTMCNC: 32 G/DL (ref 31.5–35.7)
MCV RBC AUTO: 96.2 FL (ref 79–97)
METHADONE UR QL SCN: NEGATIVE NG/ML
OPIATES UR QL SCN: POSITIVE NG/ML
OXYCODONE+OXYMORPHONE UR QL SCN: NEGATIVE NG/ML
PCP UR QL: NEGATIVE NG/ML
PH UR: 5.5 [PH] (ref 4.5–8.9)
PLATELET # BLD AUTO: 257 10*3/MM3 (ref 140–450)
POTASSIUM SERPL-SCNC: 3.9 MMOL/L (ref 3.5–5.2)
PROPOXYPH UR QL SCN: NEGATIVE NG/ML
PROT SERPL-MCNC: 6.6 G/DL (ref 6–8.5)
RBC # BLD AUTO: 3.96 10*6/MM3 (ref 3.77–5.28)
SODIUM SERPL-SCNC: 141 MMOL/L (ref 136–145)
WBC # BLD AUTO: 7.43 10*3/MM3 (ref 3.4–10.8)

## 2025-06-04 LAB
GAMMA INTERFERON BACKGROUND BLD IA-ACNC: 0.18 IU/ML
M TB IFN-G BLD-IMP: NEGATIVE
M TB IFN-G CD4+ BCKGRND COR BLD-ACNC: 0.06 IU/ML
M TB IFN-G CD4+CD8+ BCKGRND COR BLD-ACNC: 0.07 IU/ML
MITOGEN IGNF BCKGRD COR BLD-ACNC: >10 IU/ML
QUANTIFERON INCUBATION: NORMAL
SERVICE CMNT-IMP: NORMAL

## 2025-06-06 ENCOUNTER — TELEPHONE (OUTPATIENT)
Age: 65
End: 2025-06-06
Payer: COMMERCIAL

## 2025-06-06 ENCOUNTER — RESULTS FOLLOW-UP (OUTPATIENT)
Age: 65
End: 2025-06-06
Payer: COMMERCIAL

## 2025-06-06 DIAGNOSIS — L40.50 PSORIATIC ARTHRITIS: ICD-10-CM

## 2025-06-06 DIAGNOSIS — M54.41 CHRONIC BILATERAL LOW BACK PAIN WITH SCIATICA, SCIATICA LATERALITY UNSPECIFIED: Primary | ICD-10-CM

## 2025-06-06 DIAGNOSIS — M54.42 CHRONIC BILATERAL LOW BACK PAIN WITH SCIATICA, SCIATICA LATERALITY UNSPECIFIED: Primary | ICD-10-CM

## 2025-06-06 DIAGNOSIS — G89.29 CHRONIC BILATERAL LOW BACK PAIN WITH SCIATICA, SCIATICA LATERALITY UNSPECIFIED: Primary | ICD-10-CM

## 2025-06-06 RX ORDER — GABAPENTIN 100 MG/1
100 CAPSULE ORAL SEE ADMIN INSTRUCTIONS
Qty: 120 CAPSULE | Refills: 3 | Status: SHIPPED | OUTPATIENT
Start: 2025-06-06

## 2025-06-06 RX ORDER — GABAPENTIN 100 MG/1
100 CAPSULE ORAL
COMMUNITY
End: 2025-06-06 | Stop reason: SDUPTHER

## 2025-06-06 NOTE — TELEPHONE ENCOUNTER
I spoke with pt. She said that Antoinette sent in Gabapentin 400mg for her.  She said she doesn't feel like she can take that dose.  She said it makes her feel really groggy the next morning and she would rather just stay at the 300mg. She wants to know if you can cancel the rx for 400mg and just send in another rx for the 100mg tabs. -Chioma Silva, A

## 2025-06-06 NOTE — TELEPHONE ENCOUNTER
Pt notified.  I called [facility] and spoke with Dimas.  He said he would cancel the 400mg rx that was sent in. -Chioma Silva, RMA